# Patient Record
Sex: MALE | Race: BLACK OR AFRICAN AMERICAN | NOT HISPANIC OR LATINO | URBAN - METROPOLITAN AREA
[De-identification: names, ages, dates, MRNs, and addresses within clinical notes are randomized per-mention and may not be internally consistent; named-entity substitution may affect disease eponyms.]

---

## 2020-03-28 ENCOUNTER — INPATIENT (INPATIENT)
Facility: HOSPITAL | Age: 45
LOS: 4 days | Discharge: HOME | End: 2020-04-02
Attending: INTERNAL MEDICINE | Admitting: INTERNAL MEDICINE
Payer: COMMERCIAL

## 2020-03-28 VITALS
DIASTOLIC BLOOD PRESSURE: 77 MMHG | TEMPERATURE: 102 F | SYSTOLIC BLOOD PRESSURE: 126 MMHG | HEART RATE: 130 BPM | OXYGEN SATURATION: 90 %

## 2020-03-28 DIAGNOSIS — E87.6 HYPOKALEMIA: ICD-10-CM

## 2020-03-28 DIAGNOSIS — J12.9 VIRAL PNEUMONIA, UNSPECIFIED: ICD-10-CM

## 2020-03-28 DIAGNOSIS — R94.5 ABNORMAL RESULTS OF LIVER FUNCTION STUDIES: ICD-10-CM

## 2020-03-28 DIAGNOSIS — Z29.9 ENCOUNTER FOR PROPHYLACTIC MEASURES, UNSPECIFIED: ICD-10-CM

## 2020-03-28 DIAGNOSIS — B34.9 VIRAL INFECTION, UNSPECIFIED: ICD-10-CM

## 2020-03-28 DIAGNOSIS — E87.1 HYPO-OSMOLALITY AND HYPONATREMIA: ICD-10-CM

## 2020-03-28 DIAGNOSIS — R06.00 DYSPNEA, UNSPECIFIED: ICD-10-CM

## 2020-03-28 DIAGNOSIS — A41.89 OTHER SPECIFIED SEPSIS: ICD-10-CM

## 2020-03-28 LAB
ALBUMIN SERPL ELPH-MCNC: 3.3 G/DL — SIGNIFICANT CHANGE UP (ref 3.3–5)
ALP SERPL-CCNC: 39 U/L — LOW (ref 40–120)
ALT FLD-CCNC: 34 U/L — SIGNIFICANT CHANGE UP (ref 4–41)
ANION GAP SERPL CALC-SCNC: 16 MMO/L — HIGH (ref 7–14)
APTT BLD: 30.1 SEC — SIGNIFICANT CHANGE UP (ref 27.5–36.3)
AST SERPL-CCNC: 41 U/L — HIGH (ref 4–40)
BASE EXCESS BLDV CALC-SCNC: 5 MMOL/L — SIGNIFICANT CHANGE UP
BASOPHILS # BLD AUTO: 0.01 K/UL — SIGNIFICANT CHANGE UP (ref 0–0.2)
BASOPHILS NFR BLD AUTO: 0.1 % — SIGNIFICANT CHANGE UP (ref 0–2)
BASOPHILS NFR SPEC: 0 % — SIGNIFICANT CHANGE UP (ref 0–2)
BILIRUB SERPL-MCNC: 0.6 MG/DL — SIGNIFICANT CHANGE UP (ref 0.2–1.2)
BLASTS # FLD: 0 % — SIGNIFICANT CHANGE UP (ref 0–0)
BLOOD GAS VENOUS - CREATININE: 0.93 MG/DL — SIGNIFICANT CHANGE UP (ref 0.5–1.3)
BUN SERPL-MCNC: 8 MG/DL — SIGNIFICANT CHANGE UP (ref 7–23)
CALCIUM SERPL-MCNC: 9 MG/DL — SIGNIFICANT CHANGE UP (ref 8.4–10.5)
CHLORIDE BLDV-SCNC: 99 MMOL/L — SIGNIFICANT CHANGE UP (ref 96–108)
CHLORIDE SERPL-SCNC: 96 MMOL/L — LOW (ref 98–107)
CO2 SERPL-SCNC: 22 MMOL/L — SIGNIFICANT CHANGE UP (ref 22–31)
CREAT SERPL-MCNC: 0.93 MG/DL — SIGNIFICANT CHANGE UP (ref 0.5–1.3)
EOSINOPHIL # BLD AUTO: 0.16 K/UL — SIGNIFICANT CHANGE UP (ref 0–0.5)
EOSINOPHIL NFR BLD AUTO: 1.9 % — SIGNIFICANT CHANGE UP (ref 0–6)
EOSINOPHIL NFR FLD: 0 % — SIGNIFICANT CHANGE UP (ref 0–6)
ERYTHROCYTE [SEDIMENTATION RATE] IN BLOOD: 26 MM/HR — HIGH (ref 1–15)
FERRITIN SERPL-MCNC: 1047 NG/ML — HIGH (ref 30–400)
GAS PNL BLDV: 133 MMOL/L — LOW (ref 136–146)
GIANT PLATELETS BLD QL SMEAR: PRESENT — SIGNIFICANT CHANGE UP
GLUCOSE BLDV-MCNC: 112 MG/DL — HIGH (ref 70–99)
GLUCOSE SERPL-MCNC: 111 MG/DL — HIGH (ref 70–99)
HCO3 BLDV-SCNC: 29 MMOL/L — HIGH (ref 20–27)
HCT VFR BLD CALC: 42.7 % — SIGNIFICANT CHANGE UP (ref 39–50)
HCT VFR BLDV CALC: 47 % — SIGNIFICANT CHANGE UP (ref 39–51)
HGB BLD-MCNC: 14.5 G/DL — SIGNIFICANT CHANGE UP (ref 13–17)
HGB BLDV-MCNC: 15.3 G/DL — SIGNIFICANT CHANGE UP (ref 13–17)
IMM GRANULOCYTES NFR BLD AUTO: 0.4 % — SIGNIFICANT CHANGE UP (ref 0–1.5)
INR BLD: 1.32 — HIGH (ref 0.88–1.17)
LACTATE BLDV-MCNC: 1.5 MMOL/L — SIGNIFICANT CHANGE UP (ref 0.5–2)
LDH SERPL L TO P-CCNC: 476 U/L — HIGH (ref 135–225)
LYMPHOCYTES # BLD AUTO: 0.85 K/UL — LOW (ref 1–3.3)
LYMPHOCYTES # BLD AUTO: 10 % — LOW (ref 13–44)
LYMPHOCYTES NFR SPEC AUTO: 8.7 % — LOW (ref 13–44)
MAGNESIUM SERPL-MCNC: 1.7 MG/DL — SIGNIFICANT CHANGE UP (ref 1.6–2.6)
MCHC RBC-ENTMCNC: 29.3 PG — SIGNIFICANT CHANGE UP (ref 27–34)
MCHC RBC-ENTMCNC: 34 % — SIGNIFICANT CHANGE UP (ref 32–36)
MCV RBC AUTO: 86.3 FL — SIGNIFICANT CHANGE UP (ref 80–100)
METAMYELOCYTES # FLD: 0 % — SIGNIFICANT CHANGE UP (ref 0–1)
MONOCYTES # BLD AUTO: 0.29 K/UL — SIGNIFICANT CHANGE UP (ref 0–0.9)
MONOCYTES NFR BLD AUTO: 3.4 % — SIGNIFICANT CHANGE UP (ref 2–14)
MONOCYTES NFR BLD: 7.8 % — SIGNIFICANT CHANGE UP (ref 2–9)
MYELOCYTES NFR BLD: 0 % — SIGNIFICANT CHANGE UP (ref 0–0)
NEUTROPHIL AB SER-ACNC: 80.9 % — HIGH (ref 43–77)
NEUTROPHILS # BLD AUTO: 7.13 K/UL — SIGNIFICANT CHANGE UP (ref 1.8–7.4)
NEUTROPHILS NFR BLD AUTO: 84.2 % — HIGH (ref 43–77)
NEUTS BAND # BLD: 0 % — SIGNIFICANT CHANGE UP (ref 0–6)
NRBC # FLD: 0 K/UL — SIGNIFICANT CHANGE UP (ref 0–0)
OTHER - HEMATOLOGY %: 0 — SIGNIFICANT CHANGE UP
PCO2 BLDV: 37 MMHG — LOW (ref 41–51)
PH BLDV: 7.5 PH — HIGH (ref 7.32–7.43)
PHOSPHATE SERPL-MCNC: 2.6 MG/DL — SIGNIFICANT CHANGE UP (ref 2.5–4.5)
PLATELET # BLD AUTO: 192 K/UL — SIGNIFICANT CHANGE UP (ref 150–400)
PLATELET COUNT - ESTIMATE: NORMAL — SIGNIFICANT CHANGE UP
PMV BLD: 11.4 FL — SIGNIFICANT CHANGE UP (ref 7–13)
PO2 BLDV: 35 MMHG — SIGNIFICANT CHANGE UP (ref 35–40)
POLYCHROMASIA BLD QL SMEAR: SLIGHT — SIGNIFICANT CHANGE UP
POTASSIUM BLDV-SCNC: 2.9 MMOL/L — CRITICAL LOW (ref 3.4–4.5)
POTASSIUM SERPL-MCNC: 3.3 MMOL/L — LOW (ref 3.5–5.3)
POTASSIUM SERPL-SCNC: 3.3 MMOL/L — LOW (ref 3.5–5.3)
PROCALCITONIN SERPL-MCNC: 0.39 NG/ML — HIGH (ref 0.02–0.1)
PROMYELOCYTES # FLD: 0 % — SIGNIFICANT CHANGE UP (ref 0–0)
PROT SERPL-MCNC: 7.2 G/DL — SIGNIFICANT CHANGE UP (ref 6–8.3)
PROTHROM AB SERPL-ACNC: 15.3 SEC — HIGH (ref 9.8–13.1)
RBC # BLD: 4.95 M/UL — SIGNIFICANT CHANGE UP (ref 4.2–5.8)
RBC # FLD: 13.1 % — SIGNIFICANT CHANGE UP (ref 10.3–14.5)
SAO2 % BLDV: 67.6 % — SIGNIFICANT CHANGE UP (ref 60–85)
SMUDGE CELLS # BLD: PRESENT — SIGNIFICANT CHANGE UP
SODIUM SERPL-SCNC: 134 MMOL/L — LOW (ref 135–145)
VARIANT LYMPHS # BLD: 2.6 % — SIGNIFICANT CHANGE UP
WBC # BLD: 8.47 K/UL — SIGNIFICANT CHANGE UP (ref 3.8–10.5)
WBC # FLD AUTO: 8.47 K/UL — SIGNIFICANT CHANGE UP (ref 3.8–10.5)

## 2020-03-28 PROCEDURE — 99223 1ST HOSP IP/OBS HIGH 75: CPT

## 2020-03-28 PROCEDURE — 71045 X-RAY EXAM CHEST 1 VIEW: CPT | Mod: 26

## 2020-03-28 RX ORDER — ACETAMINOPHEN 500 MG
650 TABLET ORAL EVERY 6 HOURS
Refills: 0 | Status: DISCONTINUED | OUTPATIENT
Start: 2020-03-28 | End: 2020-04-02

## 2020-03-28 RX ORDER — ACETAMINOPHEN 500 MG
975 TABLET ORAL ONCE
Refills: 0 | Status: COMPLETED | OUTPATIENT
Start: 2020-03-28 | End: 2020-03-28

## 2020-03-28 RX ORDER — POTASSIUM CHLORIDE 20 MEQ
40 PACKET (EA) ORAL ONCE
Refills: 0 | Status: COMPLETED | OUTPATIENT
Start: 2020-03-28 | End: 2020-03-28

## 2020-03-28 RX ORDER — AZITHROMYCIN 500 MG/1
500 TABLET, FILM COATED ORAL ONCE
Refills: 0 | Status: COMPLETED | OUTPATIENT
Start: 2020-03-28 | End: 2020-03-28

## 2020-03-28 RX ADMIN — Medication 650 MILLIGRAM(S): at 18:47

## 2020-03-28 RX ADMIN — Medication 40 MILLIEQUIVALENT(S): at 21:58

## 2020-03-28 RX ADMIN — AZITHROMYCIN 500 MILLIGRAM(S): 500 TABLET, FILM COATED ORAL at 07:47

## 2020-03-28 RX ADMIN — Medication 650 MILLIGRAM(S): at 17:33

## 2020-03-28 RX ADMIN — Medication 975 MILLIGRAM(S): at 09:32

## 2020-03-28 RX ADMIN — Medication 975 MILLIGRAM(S): at 06:53

## 2020-03-28 NOTE — PROVIDER CONTACT NOTE (OTHER) - NAME OF MD/NP/PA/DO NOTIFIED:
Detail Level: Detailed Add 91472 Cpt? (Important Note: In 2017 The Use Of 23489 Is Being Tracked By Cms To Determine Future Global Period Reimbursement For Global Periods): no Joanna MONTERO Wound Evaluated By: Hugh

## 2020-03-28 NOTE — CHART NOTE - NSCHARTNOTEFT_GEN_A_CORE
MEDICINE PA NOTE     Informed by RN of patient with fever spike of 103. Tylenol ordered PRN. Pending admission. Will continue to monitor patient.     Joanna Dawkins PA-C  Department of Medicine/ [facility]   In House Pager #[phone]

## 2020-03-28 NOTE — ED PROVIDER NOTE - PROGRESS NOTE DETAILS
Howie PGY3: signout received from Chantal PGY3 - previously healthy 45M with hypoxia and respiratory distresss that resolved with NC CXR c/w covid/pna. gave azithromycin. on assessment pt resting and breathing comfortably satting high 90s on NC will admit

## 2020-03-28 NOTE — H&P ADULT - NSHPLABSRESULTS_GEN_ALL_CORE
14.5   8.47  )-----------( 192      ( 28 Mar 2020 09:38 )             42.7   03-28    134<L>  |  96<L>  |  8   ----------------------------<  111<H>  3.3<L>   |  22  |  0.93    Ca    9.0      28 Mar 2020 06:40  Phos  2.6     03-28  Mg     1.7     03-28    TPro  7.2  /  Alb  3.3  /  TBili  0.6  /  DBili  x   /  AST  41<H>  /  ALT  34  /  AlkPhos  39<L>  03-28    CXR reviewed:   Bibasilar opacities right greater than left consistent with covid infection.    EKG tracing reviewed and interpreted: Sinus tach,

## 2020-03-28 NOTE — ED ADULT NURSE NOTE - OBJECTIVE STATEMENT
44 yo M AAOx4 received to room 1 with multiple medical complaints: SOB, general malaise, fever and CP x 1 week, pt arrives with resps even and slightly labored, on RA pt saturation 92%, placed on 3L cannula with 98% saturation, no signs of resp distress, orally febrile, denies any pmhx/daily medications, denies cough sick contacts, VS as charted, pending MD gibson, will monitor

## 2020-03-28 NOTE — ED PROVIDER NOTE - ATTENDING CONTRIBUTION TO CARE
MD Beyer:  I performed a face to face bedside interview with patient regarding history of present illness, review of symptoms and past medical history. I completed an independent physical exam(documented below).  I have discussed patient's plan of care with resident.   I agree with note as stated above, having amended the EMR as needed to reflect my findings. I have discussed the assessment and plan of care.  This includes during the time I functioned as the attending physician for this patient.  PE:  Gen: Alert, ill-appearing but not toxic  Head: NC, AT,  EOMI, normal lids/conjunctiva  ENT:  normal hearing, patent oropharynx without erythema/exudate  Neck: +supple, no tenderness/meningismus/JVD, +Trachea midline  Chest: no chest wall tenderness, equal chest rise  Pulm: tachypneic, +crackles; no wheeze/stridor/retractions  CV: tachycardic, no M/R/G, +dist pulses  Abd: +BS, soft, NT/ND  Rectal: deferred  Mskel: no edema/erythema/cyanosis  Skin: no rash  Neuro: AAOx3   MDM:  46yo M, denies pmh, p/w fever, cough, sob X approx 1wk; on presentation pt is tachycardic, febrile, ill-appearing, tachypneic w/ increased work of breathing, O2 sat 89% on room air (w/ good waveform on monitor), up to 98% on NRB. supplemental O2, cxr, labs including covid19 pcr and admission.

## 2020-03-28 NOTE — H&P ADULT - PROBLEM SELECTOR PLAN 1
High suspicion for COVID-19 infection   Awaiting COVID-19 PCR   s/p Zithromax x 1 dose in ER, will not continue   Start Plaquenil if COVID PCR positive and patient becomes hypoxic, will hold off for now   Monitor respiratory status   Trend ESR/CRP/LDH/Ferritin q3 days   Supportive care

## 2020-03-28 NOTE — H&P ADULT - ASSESSMENT
46 yo M with no PMH presented w/ fever, shortness of breath, and cough. Suspected COVID-19 infection.

## 2020-03-28 NOTE — ED PROVIDER NOTE - RESPIRATORY, MLM
Breath sounds w/ diffuse crackles on end of inspiration BL. +Tachypnea w/ increased work of breathing.

## 2020-03-28 NOTE — ED ADULT TRIAGE NOTE - CHIEF COMPLAINT QUOTE
Pt arrives for fever, cough, difficulty breathing since last Friday. Pt was 89%RA, Placed on 3LNC up to 96%. Pt is tachycardic and appears uncomfortable

## 2020-03-28 NOTE — H&P ADULT - NSHPPHYSICALEXAM_GEN_ALL_CORE
Vital Signs Last 24 Hrs  T(C): 39.4 (28 Mar 2020 16:45), Max: 39.5 (28 Mar 2020 16:12)  T(F): 103 (28 Mar 2020 16:45), Max: 103.1 (28 Mar 2020 16:12)  HR: 117 (28 Mar 2020 16:45) (98 - 130)  BP: 139/74 (28 Mar 2020 16:45) (126/77 - 158/80)  BP(mean): --  RR: 22 (28 Mar 2020 16:45) (22 - 27)  SpO2: 96% (28 Mar 2020 16:45) (90% - 100%)

## 2020-03-28 NOTE — ED ADULT NURSE REASSESSMENT NOTE - NS ED NURSE REASSESS COMMENT FT1
Patient remains tachycardiac. Denies any headache, dizziness or palpitations. Sinus tach on the monitor. will monitor.

## 2020-03-28 NOTE — ED PROVIDER NOTE - CLINICAL SUMMARY MEDICAL DECISION MAKING FREE TEXT BOX
46 yo M, w/ no known PMH and on no daily medications, presenting from home w/ chief complaint of fever, shortness of breath, and cough x 8 days, now worsening x 3 days with cough and shortness of breath. Arrives saturating 89% on room air, currently saturating well on non-rebreather mask, but tachypneic and with increased work of breathing on exam. End inspiratory crackles diffusely, concerning for COVID vs other URI vs bacterial PNA. Will obtain labs, cxr, ekg, supplemental O2 as needed, and admit to hospital.

## 2020-03-28 NOTE — ED PROVIDER NOTE - OBJECTIVE STATEMENT
44 yo M, w/ no known PMH and on no daily medications, presenting from home w/ chief complaint of fever, shortness of breath, and cough. Patient states that symptoms started about 8 days ago, initially w/ fever (102 F t max), weakness, body aches, and decreased appetite. Patient states that has had worsening dry cough with shortness of breath over past 3 days, which acutely worsened today, prompting visit to the ED. Denies other complaints, including n/v/d/c, urinary symptoms, abdominal pain.

## 2020-03-28 NOTE — H&P ADULT - PROBLEM SELECTOR PLAN 2
Dyspnea likely d/t above   Tachypnea but not hypoxic   Supplemental O2 for comfort   Management of viral infection as above Dyspnea likely d/t above   Tachypneic but not hypoxic   Supplemental O2 for comfort   Management of viral infection as above

## 2020-03-28 NOTE — ED ADULT NURSE NOTE - NSIMPLEMENTINTERV_GEN_ALL_ED
Implemented All Universal Safety Interventions:  [place] to call system. Call bell, personal items and telephone within reach. Instruct patient to call for assistance. Room bathroom lighting operational. Non-slip footwear when patient is off stretcher. Physically safe environment: no spills, clutter or unnecessary equipment. Stretcher in lowest position, wheels locked, appropriate side rails in place.

## 2020-03-29 DIAGNOSIS — R06.03 ACUTE RESPIRATORY DISTRESS: ICD-10-CM

## 2020-03-29 LAB
ALBUMIN SERPL ELPH-MCNC: 3.1 G/DL — LOW (ref 3.3–5)
ALP SERPL-CCNC: 36 U/L — LOW (ref 40–120)
ALT FLD-CCNC: 30 U/L — SIGNIFICANT CHANGE UP (ref 4–41)
ANION GAP SERPL CALC-SCNC: 14 MMO/L — SIGNIFICANT CHANGE UP (ref 7–14)
AST SERPL-CCNC: 38 U/L — SIGNIFICANT CHANGE UP (ref 4–40)
BASOPHILS # BLD AUTO: 0.03 K/UL — SIGNIFICANT CHANGE UP (ref 0–0.2)
BASOPHILS NFR BLD AUTO: 0.3 % — SIGNIFICANT CHANGE UP (ref 0–2)
BILIRUB SERPL-MCNC: 0.7 MG/DL — SIGNIFICANT CHANGE UP (ref 0.2–1.2)
BUN SERPL-MCNC: 16 MG/DL — SIGNIFICANT CHANGE UP (ref 7–23)
CALCIUM SERPL-MCNC: 9 MG/DL — SIGNIFICANT CHANGE UP (ref 8.4–10.5)
CHLORIDE SERPL-SCNC: 95 MMOL/L — LOW (ref 98–107)
CO2 SERPL-SCNC: 27 MMOL/L — SIGNIFICANT CHANGE UP (ref 22–31)
CREAT SERPL-MCNC: 1.01 MG/DL — SIGNIFICANT CHANGE UP (ref 0.5–1.3)
EOSINOPHIL # BLD AUTO: 0 K/UL — SIGNIFICANT CHANGE UP (ref 0–0.5)
EOSINOPHIL NFR BLD AUTO: 0 % — SIGNIFICANT CHANGE UP (ref 0–6)
GLUCOSE SERPL-MCNC: 97 MG/DL — SIGNIFICANT CHANGE UP (ref 70–99)
HCT VFR BLD CALC: 47.1 % — SIGNIFICANT CHANGE UP (ref 39–50)
HGB BLD-MCNC: 15.6 G/DL — SIGNIFICANT CHANGE UP (ref 13–17)
IMM GRANULOCYTES NFR BLD AUTO: 0.5 % — SIGNIFICANT CHANGE UP (ref 0–1.5)
LYMPHOCYTES # BLD AUTO: 1.06 K/UL — SIGNIFICANT CHANGE UP (ref 1–3.3)
LYMPHOCYTES # BLD AUTO: 11.4 % — LOW (ref 13–44)
MCHC RBC-ENTMCNC: 29.2 PG — SIGNIFICANT CHANGE UP (ref 27–34)
MCHC RBC-ENTMCNC: 33.1 % — SIGNIFICANT CHANGE UP (ref 32–36)
MCV RBC AUTO: 88 FL — SIGNIFICANT CHANGE UP (ref 80–100)
MONOCYTES # BLD AUTO: 0.44 K/UL — SIGNIFICANT CHANGE UP (ref 0–0.9)
MONOCYTES NFR BLD AUTO: 4.7 % — SIGNIFICANT CHANGE UP (ref 2–14)
NEUTROPHILS # BLD AUTO: 7.73 K/UL — HIGH (ref 1.8–7.4)
NEUTROPHILS NFR BLD AUTO: 83.1 % — HIGH (ref 43–77)
NRBC # FLD: 0 K/UL — SIGNIFICANT CHANGE UP (ref 0–0)
PLATELET # BLD AUTO: 219 K/UL — SIGNIFICANT CHANGE UP (ref 150–400)
PMV BLD: 11.3 FL — SIGNIFICANT CHANGE UP (ref 7–13)
POTASSIUM SERPL-MCNC: 3.6 MMOL/L — SIGNIFICANT CHANGE UP (ref 3.5–5.3)
POTASSIUM SERPL-SCNC: 3.6 MMOL/L — SIGNIFICANT CHANGE UP (ref 3.5–5.3)
PROT SERPL-MCNC: 7.4 G/DL — SIGNIFICANT CHANGE UP (ref 6–8.3)
RBC # BLD: 5.35 M/UL — SIGNIFICANT CHANGE UP (ref 4.2–5.8)
RBC # FLD: 13.2 % — SIGNIFICANT CHANGE UP (ref 10.3–14.5)
SARS-COV-2 RNA SPEC QL NAA+PROBE: DETECTED
SODIUM SERPL-SCNC: 136 MMOL/L — SIGNIFICANT CHANGE UP (ref 135–145)
WBC # BLD: 9.31 K/UL — SIGNIFICANT CHANGE UP (ref 3.8–10.5)
WBC # FLD AUTO: 9.31 K/UL — SIGNIFICANT CHANGE UP (ref 3.8–10.5)

## 2020-03-29 PROCEDURE — 99233 SBSQ HOSP IP/OBS HIGH 50: CPT

## 2020-03-29 RX ORDER — HYDROXYCHLOROQUINE SULFATE 200 MG
TABLET ORAL
Refills: 0 | Status: DISCONTINUED | OUTPATIENT
Start: 2020-03-29 | End: 2020-04-02

## 2020-03-29 RX ORDER — ACETAMINOPHEN 500 MG
1000 TABLET ORAL ONCE
Refills: 0 | Status: COMPLETED | OUTPATIENT
Start: 2020-03-29 | End: 2020-03-29

## 2020-03-29 RX ORDER — HYDROXYCHLOROQUINE SULFATE 200 MG
400 TABLET ORAL EVERY 12 HOURS
Refills: 0 | Status: COMPLETED | OUTPATIENT
Start: 2020-03-29 | End: 2020-03-30

## 2020-03-29 RX ORDER — HYDROXYCHLOROQUINE SULFATE 200 MG
200 TABLET ORAL EVERY 12 HOURS
Refills: 0 | Status: DISCONTINUED | OUTPATIENT
Start: 2020-03-30 | End: 2020-04-02

## 2020-03-29 RX ORDER — LANOLIN ALCOHOL/MO/W.PET/CERES
3 CREAM (GRAM) TOPICAL AT BEDTIME
Refills: 0 | Status: DISCONTINUED | OUTPATIENT
Start: 2020-03-29 | End: 2020-03-30

## 2020-03-29 RX ADMIN — Medication 1000 MILLIGRAM(S): at 22:00

## 2020-03-29 RX ADMIN — Medication 400 MILLIGRAM(S): at 21:14

## 2020-03-29 RX ADMIN — Medication 650 MILLIGRAM(S): at 02:40

## 2020-03-29 RX ADMIN — Medication 3 MILLIGRAM(S): at 01:42

## 2020-03-29 RX ADMIN — Medication 400 MILLIGRAM(S): at 13:59

## 2020-03-29 RX ADMIN — Medication 1000 MILLIGRAM(S): at 11:03

## 2020-03-29 RX ADMIN — Medication 650 MILLIGRAM(S): at 01:42

## 2020-03-29 RX ADMIN — Medication 400 MILLIGRAM(S): at 10:23

## 2020-03-29 NOTE — PROGRESS NOTE ADULT - ASSESSMENT
44 yo M with no PMH presented w/ fever, shortness of breath, and cough. Found to have COVID-19 infection.

## 2020-03-29 NOTE — PROGRESS NOTE ADULT - PROBLEM SELECTOR PLAN 1
COVID-19 PCR positive   CXR w/ bibasilar opacities right greater than left  s/p Zithromax x 1 dose in ER, no need to continue   Start Plaquenil and monitor QTc  Monitor respiratory status   Trend ESR/CRP/LDH/Ferritin q3 days   Supportive care

## 2020-03-29 NOTE — PROGRESS NOTE ADULT - SUBJECTIVE AND OBJECTIVE BOX
PROGRESS NOTE:     Patient is a 45y old  Male who presents with a chief complaint of Shortness of breath and fevers (28 Mar 2020 17:10)      SUBJECTIVE / OVERNIGHT EVENTS: Pt w/ shortness of breath, worse w/ exertion. +Cough.     ADDITIONAL REVIEW OF SYSTEMS:    MEDICATIONS  (STANDING):    MEDICATIONS  (PRN):  acetaminophen   Tablet .. 650 milliGRAM(s) Oral every 6 hours PRN Temp greater or equal to 38C (100.4F), Moderate Pain (4 - 6)  melatonin 3 milliGRAM(s) Oral at bedtime PRN Insomnia      CAPILLARY BLOOD GLUCOSE        I&O's Summary      PHYSICAL EXAM:  Vital Signs Last 24 Hrs  T(C): 37.9 (29 Mar 2020 11:03), Max: 39.7 (29 Mar 2020 10:03)  T(F): 100.3 (29 Mar 2020 11:03), Max: 103.4 (29 Mar 2020 10:03)  HR: 98 (29 Mar 2020 07:09) (98 - 125)  BP: 127/78 (29 Mar 2020 07:09) (127/69 - 139/74)  BP(mean): --  RR: 18 (29 Mar 2020 07:09) (18 - 27)  SpO2: 95% (29 Mar 2020 07:09) (95% - 99%)    CONSTITUTIONAL: NAD, well-developed  RESPIRATORY: Normal respiratory effort; bibasilar crackles   CARDIOVASCULAR: Regular rate and rhythm, normal S1 and S2, no murmur/rub/gallop; No lower extremity edema; Peripheral pulses are 2+ bilaterally  ABDOMEN: Nontender to palpation, normoactive bowel sounds, no rebound/guarding; No hepatosplenomegaly  MUSCLOSKELETAL: no clubbing or cyanosis of digits; no joint swelling or tenderness to palpation  PSYCH: A+O to person, place, and time; affect appropriate    LABS:                        15.6   9.31  )-----------( 219      ( 29 Mar 2020 06:43 )             47.1     03-29    136  |  95<L>  |  16  ----------------------------<  97  3.6   |  27  |  1.01    Ca    9.0      29 Mar 2020 06:43  Phos  2.6     03-28  Mg     1.7     03-28    TPro  7.4  /  Alb  3.1<L>  /  TBili  0.7  /  DBili  x   /  AST  38  /  ALT  30  /  AlkPhos  36<L>  03-29    PT/INR - ( 28 Mar 2020 06:40 )   PT: 15.3 SEC;   INR: 1.32          PTT - ( 28 Mar 2020 06:40 )  PTT:30.1 SEC            RADIOLOGY & ADDITIONAL TESTS:  Results Reviewed:   Imaging Personally Reviewed:  Electrocardiogram Personally Reviewed:    COORDINATION OF CARE:  Care Discussed with Consultants/Other Providers [Y/N]:  Prior or Outpatient Records Reviewed [Y/N]:

## 2020-03-30 LAB
ALBUMIN SERPL ELPH-MCNC: 2.9 G/DL — LOW (ref 3.3–5)
ALP SERPL-CCNC: 27 U/L — LOW (ref 40–120)
ALT FLD-CCNC: SIGNIFICANT CHANGE UP U/L (ref 4–41)
ANION GAP SERPL CALC-SCNC: 14 MMO/L — SIGNIFICANT CHANGE UP (ref 7–14)
AST SERPL-CCNC: SIGNIFICANT CHANGE UP U/L (ref 4–40)
BILIRUB SERPL-MCNC: 0.6 MG/DL — SIGNIFICANT CHANGE UP (ref 0.2–1.2)
BUN SERPL-MCNC: 16 MG/DL — SIGNIFICANT CHANGE UP (ref 7–23)
CALCIUM SERPL-MCNC: 8.7 MG/DL — SIGNIFICANT CHANGE UP (ref 8.4–10.5)
CHLORIDE SERPL-SCNC: 93 MMOL/L — LOW (ref 98–107)
CO2 SERPL-SCNC: 25 MMOL/L — SIGNIFICANT CHANGE UP (ref 22–31)
CREAT SERPL-MCNC: 0.64 MG/DL — SIGNIFICANT CHANGE UP (ref 0.5–1.3)
GLUCOSE SERPL-MCNC: 114 MG/DL — HIGH (ref 70–99)
MAGNESIUM SERPL-MCNC: 2.5 MG/DL — SIGNIFICANT CHANGE UP (ref 1.6–2.6)
PHOSPHATE SERPL-MCNC: SIGNIFICANT CHANGE UP MG/DL (ref 2.5–4.5)
POTASSIUM SERPL-MCNC: SIGNIFICANT CHANGE UP MMOL/L (ref 3.5–5.3)
POTASSIUM SERPL-SCNC: SIGNIFICANT CHANGE UP MMOL/L (ref 3.5–5.3)
PROT SERPL-MCNC: SIGNIFICANT CHANGE UP G/DL (ref 6–8.3)
SODIUM SERPL-SCNC: 132 MMOL/L — LOW (ref 135–145)

## 2020-03-30 PROCEDURE — 93010 ELECTROCARDIOGRAM REPORT: CPT

## 2020-03-30 RX ORDER — ZOLPIDEM TARTRATE 10 MG/1
5 TABLET ORAL AT BEDTIME
Refills: 0 | Status: DISCONTINUED | OUTPATIENT
Start: 2020-03-30 | End: 2020-04-02

## 2020-03-30 RX ORDER — ALPRAZOLAM 0.25 MG
0.5 TABLET ORAL ONCE
Refills: 0 | Status: DISCONTINUED | OUTPATIENT
Start: 2020-03-30 | End: 2020-03-30

## 2020-03-30 RX ADMIN — Medication 650 MILLIGRAM(S): at 06:53

## 2020-03-30 RX ADMIN — Medication 400 MILLIGRAM(S): at 02:29

## 2020-03-30 RX ADMIN — Medication 650 MILLIGRAM(S): at 05:59

## 2020-03-30 RX ADMIN — Medication 650 MILLIGRAM(S): at 19:58

## 2020-03-30 RX ADMIN — Medication 650 MILLIGRAM(S): at 14:51

## 2020-03-30 RX ADMIN — Medication 200 MILLIGRAM(S): at 11:18

## 2020-03-30 RX ADMIN — Medication 200 MILLIGRAM(S): at 23:36

## 2020-03-30 RX ADMIN — ZOLPIDEM TARTRATE 5 MILLIGRAM(S): 10 TABLET ORAL at 23:36

## 2020-03-30 RX ADMIN — Medication 0.5 MILLIGRAM(S): at 15:14

## 2020-03-30 RX ADMIN — Medication 3 MILLIGRAM(S): at 01:12

## 2020-03-30 NOTE — PROGRESS NOTE ADULT - SUBJECTIVE AND OBJECTIVE BOX
Patient is a 45y old  Male who presents with a chief complaint of Shortness of breath and fevers (29 Mar 2020 12:52)      SUBJECTIVE / OVERNIGHT EVENTS: Pt still complaining of SOB with exertion and cough. Pt continues to be febrile 102.9 at 8pm at tachy to 110s. Currently on NC.     MEDICATIONS  (STANDING):  hydroxychloroquine   Oral   hydroxychloroquine 200 milliGRAM(s) Oral every 12 hours    MEDICATIONS  (PRN):  acetaminophen   Tablet .. 650 milliGRAM(s) Oral every 6 hours PRN Temp greater or equal to 38C (100.4F), Moderate Pain (4 - 6)  melatonin 3 milliGRAM(s) Oral at bedtime PRN Insomnia      T(C): 36.8 (03-30-20 @ 10:14), Max: 39.4 (03-29-20 @ 20:54)  HR: 111 (03-30-20 @ 10:14) (95 - 117)  BP: 135/74 (03-30-20 @ 10:14) (123/67 - 144/77)  RR: 19 (03-30-20 @ 10:14) (18 - 19)  SpO2: 91% (03-30-20 @ 10:14) (91% - 96%)  CAPILLARY BLOOD GLUCOSE      POCT Blood Glucose.: 112 mg/dL (29 Mar 2020 17:18)    I&O's Summary      CONSTITUTIONAL: NAD, well-developed  RESPIRATORY: Normal respiratory effort; bibasilar crackles   CARDIOVASCULAR: Regular rate and rhythm, normal S1 and S2, no murmur/rub/gallop; No lower extremity edema; Peripheral pulses are 2+ bilaterally  ABDOMEN: Nontender to palpation, normoactive bowel sounds, no rebound/guarding; No hepatosplenomegaly  MUSCLOSKELETAL: no clubbing or cyanosis of digits; no joint swelling or tenderness to palpation  PSYCH: A+O to person, place, and time; affect appropriate    LABS:                        15.6   9.31  )-----------( 219      ( 29 Mar 2020 06:43 )             47.1     03-30    132<L>  |  93<L>  |  16  ----------------------------<  114<H>  Test not performed SPECIMEN GROSSLY HEMOLYZED   |  25  |  0.64    Ca    8.7      30 Mar 2020 11:15  Phos  Test not performed SPECIMEN GROSSLY HEMOLYZED     03-30  Mg     2.5     03-30    TPro  Test not performed SPECIMEN GROSSLY HEMOLYZED  /  Alb  2.9<L>  /  TBili  0.6  /  DBili  x   /  AST  Test not performed SPECIMEN GROSSLY HEMOLYZED  /  ALT  Test not performed SPECIMEN GROSSLY HEMOLYZED  /  AlkPhos  27<L>  03-30              RADIOLOGY & ADDITIONAL TESTS:

## 2020-03-30 NOTE — PROGRESS NOTE ADULT - PROBLEM SELECTOR PLAN 1
COVID-19 PCR positive   CXR w/ bibasilar opacities right greater than left  Continue Plaquenil and monitor QTc  Monitor respiratory status   Trend ESR/CRP/LDH/Ferritin q3 days   Supportive care

## 2020-03-31 LAB
ALBUMIN SERPL ELPH-MCNC: 2.9 G/DL — LOW (ref 3.3–5)
ALP SERPL-CCNC: 33 U/L — LOW (ref 40–120)
ALT FLD-CCNC: 30 U/L — SIGNIFICANT CHANGE UP (ref 4–41)
ANION GAP SERPL CALC-SCNC: 18 MMO/L — HIGH (ref 7–14)
APPEARANCE UR: CLEAR — SIGNIFICANT CHANGE UP
AST SERPL-CCNC: 31 U/L — SIGNIFICANT CHANGE UP (ref 4–40)
BACTERIA # UR AUTO: SIGNIFICANT CHANGE UP
BASOPHILS # BLD AUTO: 0.03 K/UL — SIGNIFICANT CHANGE UP (ref 0–0.2)
BASOPHILS NFR BLD AUTO: 0.4 % — SIGNIFICANT CHANGE UP (ref 0–2)
BILIRUB SERPL-MCNC: 0.7 MG/DL — SIGNIFICANT CHANGE UP (ref 0.2–1.2)
BILIRUB UR-MCNC: SIGNIFICANT CHANGE UP
BLOOD UR QL VISUAL: SIGNIFICANT CHANGE UP
BUN SERPL-MCNC: 15 MG/DL — SIGNIFICANT CHANGE UP (ref 7–23)
CALCIUM SERPL-MCNC: 8.9 MG/DL — SIGNIFICANT CHANGE UP (ref 8.4–10.5)
CHLORIDE SERPL-SCNC: 96 MMOL/L — LOW (ref 98–107)
CO2 SERPL-SCNC: 23 MMOL/L — SIGNIFICANT CHANGE UP (ref 22–31)
COLOR SPEC: YELLOW — SIGNIFICANT CHANGE UP
CREAT SERPL-MCNC: 0.83 MG/DL — SIGNIFICANT CHANGE UP (ref 0.5–1.3)
CRP SERPL-MCNC: 339.6 MG/L — HIGH
EOSINOPHIL # BLD AUTO: 0.04 K/UL — SIGNIFICANT CHANGE UP (ref 0–0.5)
EOSINOPHIL NFR BLD AUTO: 0.5 % — SIGNIFICANT CHANGE UP (ref 0–6)
ERYTHROCYTE [SEDIMENTATION RATE] IN BLOOD: 70 MM/HR — HIGH (ref 1–15)
FERRITIN SERPL-MCNC: 1722 NG/ML — HIGH (ref 30–400)
GLUCOSE SERPL-MCNC: 86 MG/DL — SIGNIFICANT CHANGE UP (ref 70–99)
GLUCOSE UR-MCNC: NEGATIVE — SIGNIFICANT CHANGE UP
HCT VFR BLD CALC: 42.7 % — SIGNIFICANT CHANGE UP (ref 39–50)
HGB BLD-MCNC: 14 G/DL — SIGNIFICANT CHANGE UP (ref 13–17)
IMM GRANULOCYTES NFR BLD AUTO: 0.5 % — SIGNIFICANT CHANGE UP (ref 0–1.5)
KETONES UR-MCNC: SIGNIFICANT CHANGE UP
LEUKOCYTE ESTERASE UR-ACNC: NEGATIVE — SIGNIFICANT CHANGE UP
LYMPHOCYTES # BLD AUTO: 0.85 K/UL — LOW (ref 1–3.3)
LYMPHOCYTES # BLD AUTO: 11.1 % — LOW (ref 13–44)
MAGNESIUM SERPL-MCNC: 2.3 MG/DL — SIGNIFICANT CHANGE UP (ref 1.6–2.6)
MCHC RBC-ENTMCNC: 28.7 PG — SIGNIFICANT CHANGE UP (ref 27–34)
MCHC RBC-ENTMCNC: 32.8 % — SIGNIFICANT CHANGE UP (ref 32–36)
MCV RBC AUTO: 87.5 FL — SIGNIFICANT CHANGE UP (ref 80–100)
MONOCYTES # BLD AUTO: 0.58 K/UL — SIGNIFICANT CHANGE UP (ref 0–0.9)
MONOCYTES NFR BLD AUTO: 7.6 % — SIGNIFICANT CHANGE UP (ref 2–14)
NEUTROPHILS # BLD AUTO: 6.12 K/UL — SIGNIFICANT CHANGE UP (ref 1.8–7.4)
NEUTROPHILS NFR BLD AUTO: 79.9 % — HIGH (ref 43–77)
NITRITE UR-MCNC: NEGATIVE — SIGNIFICANT CHANGE UP
NRBC # FLD: 0 K/UL — SIGNIFICANT CHANGE UP (ref 0–0)
PH UR: 6.5 — SIGNIFICANT CHANGE UP (ref 5–8)
PHOSPHATE SERPL-MCNC: 2.6 MG/DL — SIGNIFICANT CHANGE UP (ref 2.5–4.5)
PLATELET # BLD AUTO: 283 K/UL — SIGNIFICANT CHANGE UP (ref 150–400)
PMV BLD: 11.2 FL — SIGNIFICANT CHANGE UP (ref 7–13)
POTASSIUM SERPL-MCNC: 3.6 MMOL/L — SIGNIFICANT CHANGE UP (ref 3.5–5.3)
POTASSIUM SERPL-SCNC: 3.6 MMOL/L — SIGNIFICANT CHANGE UP (ref 3.5–5.3)
PROCALCITONIN SERPL-MCNC: 0.31 NG/ML — HIGH (ref 0.02–0.1)
PROT SERPL-MCNC: 7 G/DL — SIGNIFICANT CHANGE UP (ref 6–8.3)
PROT UR-MCNC: 200 — HIGH
RBC # BLD: 4.88 M/UL — SIGNIFICANT CHANGE UP (ref 4.2–5.8)
RBC # FLD: 13.2 % — SIGNIFICANT CHANGE UP (ref 10.3–14.5)
RBC CASTS # UR COMP ASSIST: HIGH (ref 0–?)
SODIUM SERPL-SCNC: 137 MMOL/L — SIGNIFICANT CHANGE UP (ref 135–145)
SP GR SPEC: 1.03 — SIGNIFICANT CHANGE UP (ref 1–1.04)
SQUAMOUS # UR AUTO: SIGNIFICANT CHANGE UP
UROBILINOGEN FLD QL: HIGH
WBC # BLD: 7.66 K/UL — SIGNIFICANT CHANGE UP (ref 3.8–10.5)
WBC # FLD AUTO: 7.66 K/UL — SIGNIFICANT CHANGE UP (ref 3.8–10.5)
WBC UR QL: HIGH (ref 0–?)

## 2020-03-31 PROCEDURE — 93010 ELECTROCARDIOGRAM REPORT: CPT

## 2020-03-31 PROCEDURE — 99233 SBSQ HOSP IP/OBS HIGH 50: CPT

## 2020-03-31 RX ORDER — ENOXAPARIN SODIUM 100 MG/ML
40 INJECTION SUBCUTANEOUS DAILY
Refills: 0 | Status: DISCONTINUED | OUTPATIENT
Start: 2020-03-31 | End: 2020-04-02

## 2020-03-31 RX ADMIN — ZOLPIDEM TARTRATE 5 MILLIGRAM(S): 10 TABLET ORAL at 22:17

## 2020-03-31 RX ADMIN — Medication 200 MILLIGRAM(S): at 10:40

## 2020-03-31 RX ADMIN — ENOXAPARIN SODIUM 40 MILLIGRAM(S): 100 INJECTION SUBCUTANEOUS at 10:40

## 2020-03-31 RX ADMIN — Medication 200 MILLIGRAM(S): at 22:34

## 2020-03-31 RX ADMIN — Medication 650 MILLIGRAM(S): at 05:34

## 2020-03-31 RX ADMIN — Medication 650 MILLIGRAM(S): at 19:40

## 2020-03-31 RX ADMIN — Medication 650 MILLIGRAM(S): at 13:27

## 2020-03-31 NOTE — PROGRESS NOTE ADULT - PROBLEM SELECTOR PLAN 6
Normalized
K hemolyzed today  Will repeat BMP (no need for repeat today to minimize exposure)
Supplement K

## 2020-03-31 NOTE — PROGRESS NOTE ADULT - SUBJECTIVE AND OBJECTIVE BOX
CHIEF COMPLAINT: Patient is a 45y old  male who presents with a chief complaint of Shortness of breath and fevers (30 Mar 2020 13:43)      SUBJECTIVE / OVERNIGHT EVENTS:    Complains of fevers. Denies SOB. Denies other complaints.    MEDICATIONS  (STANDING):  enoxaparin Injectable 40 milliGRAM(s) SubCutaneous daily  hydroxychloroquine   Oral   hydroxychloroquine 200 milliGRAM(s) Oral every 12 hours    MEDICATIONS  (PRN):  acetaminophen   Tablet .. 650 milliGRAM(s) Oral every 6 hours PRN Temp greater or equal to 38C (100.4F), Moderate Pain (4 - 6)  zolpidem 5 milliGRAM(s) Oral at bedtime PRN Insomnia      VITALS:  T(F): 102 (03-31-20 @ 13:28), Max: 102.8 (03-30-20 @ 19:51)  HR: 97 (03-31-20 @ 12:44) (89 - 119)  BP: 122/75 (03-31-20 @ 12:44) (122/75 - 125/75)  RR: 19 (03-31-20 @ 12:44) (19 - 19)  SpO2: 97% (03-31-20 @ 12:44)  Height (cm): 170.2 (14:43)  Weight (kg): 75.4 (14:43)  BMI (kg/m2): 26 (14:43)    CAPILLARY BLOOD GLUCOSE    Output   Height (cm): 170.2 (14:43)  Weight (kg): 75.4 (14:43)  BMI (kg/m2): 26 (14:43)  I&O's Summary  T(F): 102 (03-31-20 @ 13:28), Max: 102.8 (03-30-20 @ 19:51)  HR: 97 (03-31-20 @ 12:44) (89 - 119)  BP: 122/75 (03-31-20 @ 12:44) (122/75 - 125/75)  RR: 19 (03-31-20 @ 12:44) (19 - 19)  SpO2: 97% (03-31-20 @ 12:44)    PHYSICAL EXAM:  GENERAL: NAD, well-developed  HEAD:  Atraumatic, Normocephalic  EYES: EOMI  NECK: Supple, No JVD  CHEST/LUNG: nonlabored breathing  HEART: nl S1/S2  ABDOMEN: nondistended, soft  EXTREMITIES:  no LE edema  PSYCH: alert, answering questions appropriately  NEUROLOGY: non-focal  SKIN: No rashes noted    LABS:              14.0                 137  | 23   | 15           7.66  >-----------< 283     ------------------------< 86                    42.7                 3.6  | 96   | 0.83                                         Ca 8.9   Mg 2.3   Ph 2.6         TPro  7.0  /  Alb  2.9      TBili  0.7  /  DBili  x         AST  31  /  ALT  30            AlkPhos  33                    MICROBIOLOGY:        RADIOLOGY & ADDITIONAL TESTS:    Imaging Personally Reviewed:    < from: Xray Chest 1 View AP/PA (03.28.20 @ 06:50) >  IMPRESSION:Bibasilar opacities right greater than left consistent with covid infection.      < end of copied text >        [x] Care Discussed with Consultants/Other Providers:      Harry Sotomayor M.D.  Hospitalist  Pager [phone]

## 2020-03-31 NOTE — PROGRESS NOTE ADULT - PROBLEM SELECTOR PLAN 1
COVID-19 PCR positive   CXR w/ bibasilar opacities right greater than left  Continue Plaquenil and monitor QTc  Monitor respiratory status   Trend ESR/CRP/LDH/Ferritin q3 days   Supportive care  Wean O2 as tolerated

## 2020-04-01 LAB
ALBUMIN SERPL ELPH-MCNC: 3.4 G/DL — SIGNIFICANT CHANGE UP (ref 3.3–5)
ALP SERPL-CCNC: 39 U/L — LOW (ref 40–120)
ALT FLD-CCNC: 31 U/L — SIGNIFICANT CHANGE UP (ref 4–41)
ANION GAP SERPL CALC-SCNC: 17 MMO/L — HIGH (ref 7–14)
AST SERPL-CCNC: 31 U/L — SIGNIFICANT CHANGE UP (ref 4–40)
BASOPHILS # BLD AUTO: 0.03 K/UL — SIGNIFICANT CHANGE UP (ref 0–0.2)
BASOPHILS NFR BLD AUTO: 0.4 % — SIGNIFICANT CHANGE UP (ref 0–2)
BILIRUB SERPL-MCNC: 0.9 MG/DL — SIGNIFICANT CHANGE UP (ref 0.2–1.2)
BUN SERPL-MCNC: 14 MG/DL — SIGNIFICANT CHANGE UP (ref 7–23)
CALCIUM SERPL-MCNC: 9.8 MG/DL — SIGNIFICANT CHANGE UP (ref 8.4–10.5)
CHLORIDE SERPL-SCNC: 94 MMOL/L — LOW (ref 98–107)
CO2 SERPL-SCNC: 26 MMOL/L — SIGNIFICANT CHANGE UP (ref 22–31)
CREAT SERPL-MCNC: 0.86 MG/DL — SIGNIFICANT CHANGE UP (ref 0.5–1.3)
EOSINOPHIL # BLD AUTO: 0.07 K/UL — SIGNIFICANT CHANGE UP (ref 0–0.5)
EOSINOPHIL NFR BLD AUTO: 0.9 % — SIGNIFICANT CHANGE UP (ref 0–6)
GLUCOSE SERPL-MCNC: 93 MG/DL — SIGNIFICANT CHANGE UP (ref 70–99)
HCT VFR BLD CALC: 48.7 % — SIGNIFICANT CHANGE UP (ref 39–50)
HGB BLD-MCNC: 15.6 G/DL — SIGNIFICANT CHANGE UP (ref 13–17)
IMM GRANULOCYTES NFR BLD AUTO: 0.5 % — SIGNIFICANT CHANGE UP (ref 0–1.5)
LYMPHOCYTES # BLD AUTO: 0.77 K/UL — LOW (ref 1–3.3)
LYMPHOCYTES # BLD AUTO: 10.2 % — LOW (ref 13–44)
MAGNESIUM SERPL-MCNC: 2.6 MG/DL — SIGNIFICANT CHANGE UP (ref 1.6–2.6)
MCHC RBC-ENTMCNC: 28.5 PG — SIGNIFICANT CHANGE UP (ref 27–34)
MCHC RBC-ENTMCNC: 32 % — SIGNIFICANT CHANGE UP (ref 32–36)
MCV RBC AUTO: 88.9 FL — SIGNIFICANT CHANGE UP (ref 80–100)
MONOCYTES # BLD AUTO: 0.61 K/UL — SIGNIFICANT CHANGE UP (ref 0–0.9)
MONOCYTES NFR BLD AUTO: 8 % — SIGNIFICANT CHANGE UP (ref 2–14)
NEUTROPHILS # BLD AUTO: 6.06 K/UL — SIGNIFICANT CHANGE UP (ref 1.8–7.4)
NEUTROPHILS NFR BLD AUTO: 80 % — HIGH (ref 43–77)
NRBC # FLD: 0 K/UL — SIGNIFICANT CHANGE UP (ref 0–0)
PHOSPHATE SERPL-MCNC: 3.3 MG/DL — SIGNIFICANT CHANGE UP (ref 2.5–4.5)
PLATELET # BLD AUTO: 326 K/UL — SIGNIFICANT CHANGE UP (ref 150–400)
PMV BLD: 11.2 FL — SIGNIFICANT CHANGE UP (ref 7–13)
POTASSIUM SERPL-MCNC: 3.6 MMOL/L — SIGNIFICANT CHANGE UP (ref 3.5–5.3)
POTASSIUM SERPL-SCNC: 3.6 MMOL/L — SIGNIFICANT CHANGE UP (ref 3.5–5.3)
PROT SERPL-MCNC: 8.2 G/DL — SIGNIFICANT CHANGE UP (ref 6–8.3)
RBC # BLD: 5.48 M/UL — SIGNIFICANT CHANGE UP (ref 4.2–5.8)
RBC # FLD: 13.2 % — SIGNIFICANT CHANGE UP (ref 10.3–14.5)
SODIUM SERPL-SCNC: 137 MMOL/L — SIGNIFICANT CHANGE UP (ref 135–145)
WBC # BLD: 7.58 K/UL — SIGNIFICANT CHANGE UP (ref 3.8–10.5)
WBC # FLD AUTO: 7.58 K/UL — SIGNIFICANT CHANGE UP (ref 3.8–10.5)

## 2020-04-01 PROCEDURE — 99233 SBSQ HOSP IP/OBS HIGH 50: CPT

## 2020-04-01 PROCEDURE — 93010 ELECTROCARDIOGRAM REPORT: CPT

## 2020-04-01 RX ADMIN — Medication 650 MILLIGRAM(S): at 09:10

## 2020-04-01 RX ADMIN — ENOXAPARIN SODIUM 40 MILLIGRAM(S): 100 INJECTION SUBCUTANEOUS at 13:00

## 2020-04-01 RX ADMIN — Medication 650 MILLIGRAM(S): at 18:02

## 2020-04-01 RX ADMIN — Medication 200 MILLIGRAM(S): at 13:00

## 2020-04-01 RX ADMIN — Medication 650 MILLIGRAM(S): at 01:57

## 2020-04-01 NOTE — PROGRESS NOTE ADULT - ASSESSMENT
46 yo M with no PMH presented w/ fever, shortness of breath, and cough. Found to have COVID-19 infection.

## 2020-04-01 NOTE — PROGRESS NOTE ADULT - PROBLEM SELECTOR PLAN 1
COVID-19 PCR positive   CXR w/ bibasilar opacities right greater than left  Continue Plaquenil and monitor QTc  Monitor respiratory status   Supportive care  Wean O2 as tolerated

## 2020-04-01 NOTE — PROGRESS NOTE ADULT - SUBJECTIVE AND OBJECTIVE BOX
Patient is a 45y old  Male who presents with a chief complaint of Shortness of breath and fevers (31 Mar 2020 14:42)      SUBJECTIVE / OVERNIGHT EVENTS: Pt remains febrile overnight to 100.4 overnight. Remains febrile throughout yesterday.     MEDICATIONS  (STANDING):  enoxaparin Injectable 40 milliGRAM(s) SubCutaneous daily  hydroxychloroquine   Oral   hydroxychloroquine 200 milliGRAM(s) Oral every 12 hours    MEDICATIONS  (PRN):  acetaminophen   Tablet .. 650 milliGRAM(s) Oral every 6 hours PRN Temp greater or equal to 38C (100.4F), Moderate Pain (4 - 6)  zolpidem 5 milliGRAM(s) Oral at bedtime PRN Insomnia      T(C): 37.6 (20 @ 10:20), Max: 39.3 (20 @ 01:55)  HR: 107 (20 @ 05:02) (76 - 109)  BP: 125/78 (20 @ 05:02) (122/75 - 134/81)  RR: 18 (20 @ 05:02) (18 - 19)  SpO2: 95% (20 @ 05:02) (95% - 100%)  CAPILLARY BLOOD GLUCOSE        I&O's Summary    2020 07:01  -  2020 11:09  --------------------------------------------------------  IN: 0 mL / OUT: 220 mL / NET: -220 mL        PHYSICAL EXAM:  GENERAL: NAD, well-developed  HEAD:  Atraumatic, Normocephalic  EYES: EOMI  NECK: Supple, No JVD  CHEST/LUNG: nonlabored breathing  HEART: nl S1/S2  ABDOMEN: nondistended, soft  EXTREMITIES:  no LE edema  PSYCH: alert, answering questions appropriately  NEUROLOGY: non-focal  SKIN: No rashes noted    LABS:                        15.6   7.58  )-----------( 326      ( 2020 06:30 )             48.7     04-    137  |  94<L>  |  14  ----------------------------<  93  3.6   |  26  |  0.86    Ca    9.8      2020 06:30  Phos  3.3     04-  Mg     2.6     04-    TPro  8.2  /  Alb  3.4  /  TBili  0.9  /  DBili  x   /  AST  31  /  ALT  31  /  AlkPhos  39<L>  04-          Urinalysis Basic - ( 31 Mar 2020 21:00 )    Color: YELLOW / Appearance: CLEAR / S.035 / pH: 6.5  Gluc: NEGATIVE / Ketone: SMALL  / Bili: TRACE / Urobili: MODERATE   Blood: SMALL / Protein: 200 / Nitrite: NEGATIVE   Leuk Esterase: NEGATIVE / RBC: 6-10 / WBC 11-25   Sq Epi: FEW / Non Sq Epi: x / Bacteria: FEW        RADIOLOGY & ADDITIONAL TESTS:

## 2020-04-02 VITALS
TEMPERATURE: 101 F | DIASTOLIC BLOOD PRESSURE: 84 MMHG | SYSTOLIC BLOOD PRESSURE: 134 MMHG | HEART RATE: 111 BPM | RESPIRATION RATE: 18 BRPM | OXYGEN SATURATION: 93 %

## 2020-04-02 LAB
ANION GAP SERPL CALC-SCNC: 12 MMO/L — SIGNIFICANT CHANGE UP (ref 7–14)
BASOPHILS # BLD AUTO: 0.03 K/UL — SIGNIFICANT CHANGE UP (ref 0–0.2)
BASOPHILS NFR BLD AUTO: 0.4 % — SIGNIFICANT CHANGE UP (ref 0–2)
BUN SERPL-MCNC: 12 MG/DL — SIGNIFICANT CHANGE UP (ref 7–23)
CALCIUM SERPL-MCNC: 9.2 MG/DL — SIGNIFICANT CHANGE UP (ref 8.4–10.5)
CHLORIDE SERPL-SCNC: 96 MMOL/L — LOW (ref 98–107)
CO2 SERPL-SCNC: 27 MMOL/L — SIGNIFICANT CHANGE UP (ref 22–31)
CREAT SERPL-MCNC: 0.78 MG/DL — SIGNIFICANT CHANGE UP (ref 0.5–1.3)
EOSINOPHIL # BLD AUTO: 0.15 K/UL — SIGNIFICANT CHANGE UP (ref 0–0.5)
EOSINOPHIL NFR BLD AUTO: 1.9 % — SIGNIFICANT CHANGE UP (ref 0–6)
GLUCOSE SERPL-MCNC: 95 MG/DL — SIGNIFICANT CHANGE UP (ref 70–99)
HCT VFR BLD CALC: 42.8 % — SIGNIFICANT CHANGE UP (ref 39–50)
HGB BLD-MCNC: 14.2 G/DL — SIGNIFICANT CHANGE UP (ref 13–17)
IMM GRANULOCYTES NFR BLD AUTO: 0.6 % — SIGNIFICANT CHANGE UP (ref 0–1.5)
LYMPHOCYTES # BLD AUTO: 1.03 K/UL — SIGNIFICANT CHANGE UP (ref 1–3.3)
LYMPHOCYTES # BLD AUTO: 13.3 % — SIGNIFICANT CHANGE UP (ref 13–44)
MAGNESIUM SERPL-MCNC: 2.2 MG/DL — SIGNIFICANT CHANGE UP (ref 1.6–2.6)
MCHC RBC-ENTMCNC: 28.9 PG — SIGNIFICANT CHANGE UP (ref 27–34)
MCHC RBC-ENTMCNC: 33.2 % — SIGNIFICANT CHANGE UP (ref 32–36)
MCV RBC AUTO: 87 FL — SIGNIFICANT CHANGE UP (ref 80–100)
MONOCYTES # BLD AUTO: 0.81 K/UL — SIGNIFICANT CHANGE UP (ref 0–0.9)
MONOCYTES NFR BLD AUTO: 10.5 % — SIGNIFICANT CHANGE UP (ref 2–14)
NEUTROPHILS # BLD AUTO: 5.65 K/UL — SIGNIFICANT CHANGE UP (ref 1.8–7.4)
NEUTROPHILS NFR BLD AUTO: 73.3 % — SIGNIFICANT CHANGE UP (ref 43–77)
NRBC # FLD: 0 K/UL — SIGNIFICANT CHANGE UP (ref 0–0)
PHOSPHATE SERPL-MCNC: 2.8 MG/DL — SIGNIFICANT CHANGE UP (ref 2.5–4.5)
PLATELET # BLD AUTO: 356 K/UL — SIGNIFICANT CHANGE UP (ref 150–400)
PMV BLD: 10.9 FL — SIGNIFICANT CHANGE UP (ref 7–13)
POTASSIUM SERPL-MCNC: 3.6 MMOL/L — SIGNIFICANT CHANGE UP (ref 3.5–5.3)
POTASSIUM SERPL-SCNC: 3.6 MMOL/L — SIGNIFICANT CHANGE UP (ref 3.5–5.3)
RBC # BLD: 4.92 M/UL — SIGNIFICANT CHANGE UP (ref 4.2–5.8)
RBC # FLD: 13.2 % — SIGNIFICANT CHANGE UP (ref 10.3–14.5)
SODIUM SERPL-SCNC: 135 MMOL/L — SIGNIFICANT CHANGE UP (ref 135–145)
WBC # BLD: 7.72 K/UL — SIGNIFICANT CHANGE UP (ref 3.8–10.5)
WBC # FLD AUTO: 7.72 K/UL — SIGNIFICANT CHANGE UP (ref 3.8–10.5)

## 2020-04-02 PROCEDURE — 99239 HOSP IP/OBS DSCHRG MGMT >30: CPT

## 2020-04-02 PROCEDURE — 93010 ELECTROCARDIOGRAM REPORT: CPT

## 2020-04-02 RX ORDER — ACETAMINOPHEN 500 MG
2 TABLET ORAL
Qty: 0 | Refills: 0 | DISCHARGE
Start: 2020-04-02

## 2020-04-02 RX ADMIN — Medication 200 MILLIGRAM(S): at 01:13

## 2020-04-02 RX ADMIN — ENOXAPARIN SODIUM 40 MILLIGRAM(S): 100 INJECTION SUBCUTANEOUS at 12:32

## 2020-04-02 RX ADMIN — Medication 650 MILLIGRAM(S): at 18:06

## 2020-04-02 RX ADMIN — ZOLPIDEM TARTRATE 5 MILLIGRAM(S): 10 TABLET ORAL at 01:13

## 2020-04-02 RX ADMIN — Medication 200 MILLIGRAM(S): at 12:31

## 2020-04-02 NOTE — PROGRESS NOTE ADULT - REASON FOR ADMISSION
Shortness of breath and fevers

## 2020-04-02 NOTE — DISCHARGE NOTE PROVIDER - HOSPITAL COURSE
44 yo M, w/ no PMH/ no medications, presenting from home w/ chief complaint of fever, shortness of breath, and cough. Patient states that symptoms started about 8 days ago, initially w/ fever (102 F t max), weakness, body aches, and decreased appetite. Patient states that he has had worsening dry cough with shortness of breath over past 3 days, which acutely worsened today, prompting visit to the ED.        COVID POSITIVE (3/28)    -CXR: Bibasilar opacities right greater than left consistent with covid infection.    -s/p zithromax x 1 dose in ED     Started on Plaquenil 3/29-complete course.    Pt stable on RA, Currently a febrile.    Case reviewed with attending who cleared for discharge. Plan discussed with patient who consented and agreed with plan of care.    You were found to be COVID +. It is a viral infection and as such no antibiotics are required. You are advised to take tylenol as needed for fever management. Please call [phone] if you have significant shortness of breath. Otherwise, you are recommended to stay home and self-quarantine for 14 days from today. Limit contact with those in your home and make sure to wear a mask when around other people. You were given a packet and are advised to follow instructions as indicated.

## 2020-04-02 NOTE — PROGRESS NOTE ADULT - PROBLEM SELECTOR PLAN 2
Due to COVID-19 infection   Currently on 3L nc   Management of viral infection as above  Monitor O2 sats
Due to COVID-19 infection   Currently on NC, oxygen escalation/deescalation as tolerated  Management of viral infection as above  Monitor O2 sats
Due to COVID-19 infection   Currently on NC, wean O2 as tolerated  Management of viral infection as above  Breathing and satting well on RA
Due to COVID-19 infection   Currently on NC, wean O2 as tolerated  Management of viral infection as above  Monitor O2 sats
Due to COVID-19 infection   Currently on NC, wean O2 as tolerated  Management of viral infection as above  Monitor O2 sats

## 2020-04-02 NOTE — DISCHARGE NOTE PROVIDER - PROVIDER TOKENS
FREE:[LAST:[Michele],FIRST:[Edawais],PHONE:[(   )    -],FAX:[(   )    -],ADDRESS:[F/u with PCP],FOLLOWUP:[2 weeks]]

## 2020-04-02 NOTE — PROGRESS NOTE ADULT - PROBLEM SELECTOR PLAN 4
Likely d/t viral illness  Resolved
Likely d/t viral illness  Resolved
Likely d/t viral illness  Trend LFT's
Likely d/t viral illness  Trend LFT's  No further work up for now
Likely d/t viral illness  Trend LFT's  No further work up for now

## 2020-04-02 NOTE — DISCHARGE NOTE PROVIDER - NSDCCPCAREPLAN_GEN_ALL_CORE_FT
PRINCIPAL DISCHARGE DIAGNOSIS  Diagnosis: Infection due to 2019 novel coronavirus  Assessment and Plan of Treatment: You have been diagnosed with the COVID-19 virus during your hospital stay. You must self quarantine to complete a 14 day time period.  Monitor for fevers, shortness of breath and cough primarily.  Monitor your temperature daily to not any changes and increases.    It has been determined that you no longer need hospitalization and can recover while remaining in self-quarantine at home. You should follow the prevention steps below until a healthcare provider or local or state health department says you can return to your normal activities.  1. You should restrict activities outside your home, except for getting medical care.  2. Do not go to work, school, or public areas.  3. Avoid using public transportation, ride-sharing, or taxis.  4. Separate yourself from other people and animals in your home.  5. Call ahead before visiting your doctor.  6. Wear a facemask.  7. Cover your coughs and sneezes.  8. Clean your hands often.  9. Avoid sharing personal household items.  10. Clean all “high-touch” surfaces everyday.  11. Monitor your symptoms.  If you have a medical emergency and need to call 911, notify the dispatch personnel that you have COVID-19 If possible, put on a facemask before emergency medical services arrive.  12. Stopping home isolation.  Patients with confirmed COVID-19 should remain under home isolation precautions for 14 days since the positive COVID-19 test and until the risk of secondary transmission to others is thought to be low. The decision to discontinue home isolation precautions should be made on a Case reviewed with attending who cleared for discharge. Plan discussed with patient who consented and agreed with plan of care.-by-Case reviewed with attending who cleared for discharge. Plan discussed with patient who consented and agreed with plan of care. basis, in consultation with healthcare providers and state and local health departments. Your [facility] Department of Health can be reached at [phone] for further information about COVID-19.

## 2020-04-02 NOTE — DISCHARGE NOTE PROVIDER - NSDCMRMEDTOKEN_GEN_ALL_CORE_FT
acetaminophen 325 mg oral tablet: 2 tab(s) orally every 6 hours, As needed, Temp greater or equal to 38C (100.4F), Moderate Pain (4 - 6)

## 2020-04-02 NOTE — PROGRESS NOTE ADULT - PROBLEM SELECTOR PLAN 3
Sepsis (fever, tachycardia) likely d/t viral infection   Management as above

## 2020-04-02 NOTE — PROGRESS NOTE ADULT - SUBJECTIVE AND OBJECTIVE BOX
CHIEF COMPLAINT: Patient is a 45y old  male who presents with a chief complaint of Shortness of breath and fevers (2020 11:09)      SUBJECTIVE / OVERNIGHT EVENTS:    Feeling well. Off oxygen. Denies any complaints.    MEDICATIONS  (STANDING):  enoxaparin Injectable 40 milliGRAM(s) SubCutaneous daily  hydroxychloroquine   Oral   hydroxychloroquine 200 milliGRAM(s) Oral every 12 hours    MEDICATIONS  (PRN):  acetaminophen   Tablet .. 650 milliGRAM(s) Oral every 6 hours PRN Temp greater or equal to 38C (100.4F), Moderate Pain (4 - 6)  zolpidem 5 milliGRAM(s) Oral at bedtime PRN Insomnia      VITALS:  T(F): 98.6 (20 @ 12:30), Max: 101 (20 @ 17:58)  HR: 93 (20 @ 14:50) (93 - 122)  BP: 129/86 (20 @ 12:30) (129/78 - 131/76)  RR: 18 (20 @ 12:30) (18 - 18)  SpO2: 95% (20 @ 14:50)      CAPILLARY BLOOD GLUCOSE    Output     I&O's Summary  T(F): 98.6 (20 @ 12:30), Max: 101 (20 @ 17:58)  HR: 93 (20 @ 14:50) (93 - 122)  BP: 129/86 (20 @ 12:30) (129/78 - 131/76)  RR: 18 (20 @ 12:30) (18 - 18)  SpO2: 95% (20 @ 14:50)    PHYSICAL EXAM:  GENERAL: NAD, well-developed  HEAD:  Atraumatic, Normocephalic  EYES: EOMI  NECK: Supple, No JVD  CHEST/LUNG: nonlabored breathing  HEART: nl S1/S2  ABDOMEN: nondistended, soft  EXTREMITIES:  no LE edema  PSYCH: alert, answering questions appropriately  NEUROLOGY: non-focal  SKIN: No rashes noted    LABS:              14.2                 135  | 27   | 12           7.72  >-----------< 356     ------------------------< 95                    42.8                 3.6  | 96   | 0.78                                         Ca 9.2   Mg 2.2   Ph 2.8         TPro  8.2  /  Alb  3.4      TBili  0.9  /  DBili  x         AST  31  /  ALT  31            AlkPhos  39            Urinalysis Basic - ( 31 Mar 2020 21:00 )    Color: YELLOW / Appearance: CLEAR / S.035 / pH: 6.5  Gluc: NEGATIVE / Ketone: SMALL  / Bili: TRACE / Urobili: MODERATE   Blood: SMALL / Protein: 200 / Nitrite: NEGATIVE   Leuk Esterase: NEGATIVE / RBC: 6-10 / WBC 11-25   Sq Epi: FEW / Non Sq Epi: x / Bacteria: FEW            MICROBIOLOGY:    Culture - Blood (collected 2020 08:35)  Source: .Blood Blood-Peripheral  Preliminary Report (2020 09:01):    No growth to date.    Culture - Blood (collected 2020 05:55)  Source: .Blood Blood-Venous  Preliminary Report (2020 06:01):    No growth to date.          RADIOLOGY & ADDITIONAL TESTS:    Imaging Personally Reviewed:    < from: Xray Chest 1 View AP/PA (20 @ 06:50) >  IMPRESSION:Bibasilar opacities right greater than left consistent with covid infection.    < end of copied text >        [x] Care Discussed with Consultants/Other Providers:      Harry Sotomayor M.D.  Hospitalist  Pager [phone]

## 2020-04-02 NOTE — PROGRESS NOTE ADULT - PROBLEM SELECTOR PLAN 1
COVID-19 PCR positive   CXR w/ bibasilar opacities right greater than left  Continue Plaquenil and monitor QTc  Breathing well on RA

## 2020-04-02 NOTE — DISCHARGE NOTE NURSING/CASE MANAGEMENT/SOCIAL WORK - PATIENT PORTAL LINK FT
You can access the FollowMyHealth Patient Portal offered by [facility] by registering at the following website: [email or web address]. By joining [employer]’s FollowMyHealth portal, you will also be able to view your health information using other applications (apps) compatible with our system.

## 2020-04-05 LAB
CULTURE RESULTS: SIGNIFICANT CHANGE UP
CULTURE RESULTS: SIGNIFICANT CHANGE UP
SPECIMEN SOURCE: SIGNIFICANT CHANGE UP
SPECIMEN SOURCE: SIGNIFICANT CHANGE UP

## 2021-09-23 NOTE — PATIENT PROFILE ADULT - FALL HARM RISK
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  As per daughter, patient was taking tylenol which did not help his pain prompting the patient to ask his daughter to take him to the hospital. ON admission, he has no fever or Leukocytosis. The Xray of Right foot shows no Osteomyelitis but MRI of Right foot shows Lateral soft tissue wound with marrow signal abnormality throughout the fifth metatarsal which is consistent of Osteomyelitis. He has seen by Podiatry and wound culture has sent, Zosyn and Vancomycin has started. The ID consult requested to assist with further evaluation and antibiotic management.     # Right Fifth metatarsal DFU with drainage and Osteomyelitis- wound cx grew Enterococcus, Aeromonas and Klebsiella - Zosyn is the ideal to cover All organisms but kidney function is worsening, hence change to Unasyn and Levaquin until kidney function is improved     would recommend:    1. Adjust Levaquin dose to 250 mg q 48 hours  2. Continue Unasyn based on crcl  3. Monitor Kidney function and adjust Abx doses accordingly  4. Wound care as per Podiatry  5. Pain management as needed    d/w patient and Nursing staff     Attending Attestation:    Spent more than 35 minutes on total encounter, more than 50 % of the visit was spent counseling and/or coordinating care by the Attending physician.     other